# Patient Record
Sex: FEMALE | Race: WHITE | Employment: FULL TIME | URBAN - METROPOLITAN AREA
[De-identification: names, ages, dates, MRNs, and addresses within clinical notes are randomized per-mention and may not be internally consistent; named-entity substitution may affect disease eponyms.]

---

## 2019-08-09 PROBLEM — F33.1 DEPRESSION, MAJOR, RECURRENT, MODERATE (HCC): Status: ACTIVE | Noted: 2019-08-09

## 2019-08-09 PROBLEM — E66.01 OBESITY, MORBID (HCC): Status: ACTIVE | Noted: 2019-08-09

## 2020-03-12 ENCOUNTER — HOSPITAL ENCOUNTER (EMERGENCY)
Age: 35
Discharge: HOME | End: 2020-03-12
Attending: EMERGENCY MEDICINE
Payer: SELF-PAY

## 2020-03-12 VITALS
WEIGHT: 210 LBS | TEMPERATURE: 97.9 F | SYSTOLIC BLOOD PRESSURE: 140 MMHG | DIASTOLIC BLOOD PRESSURE: 83 MMHG | BODY MASS INDEX: 32.96 KG/M2 | HEART RATE: 108 BPM | HEIGHT: 67 IN | OXYGEN SATURATION: 96 % | RESPIRATION RATE: 20 BRPM

## 2020-03-12 DIAGNOSIS — S61.512A LACERATION OF LEFT WRIST, INITIAL ENCOUNTER: Primary | ICD-10-CM

## 2020-03-12 DIAGNOSIS — S66.922A: ICD-10-CM

## 2020-03-12 PROCEDURE — 75810000293 HC SIMP/SUPERF WND  RPR

## 2020-03-12 PROCEDURE — 74011250636 HC RX REV CODE- 250/636: Performed by: EMERGENCY MEDICINE

## 2020-03-12 PROCEDURE — 90715 TDAP VACCINE 7 YRS/> IM: CPT | Performed by: EMERGENCY MEDICINE

## 2020-03-12 PROCEDURE — 99284 EMERGENCY DEPT VISIT MOD MDM: CPT

## 2020-03-12 PROCEDURE — 90471 IMMUNIZATION ADMIN: CPT

## 2020-03-12 RX ORDER — KETOROLAC TROMETHAMINE 10 MG/1
10 TABLET, FILM COATED ORAL
Qty: 15 TAB | Refills: 0 | Status: SHIPPED | OUTPATIENT
Start: 2020-03-12 | End: 2020-03-17

## 2020-03-12 RX ADMIN — TETANUS TOXOID, REDUCED DIPHTHERIA TOXOID AND ACELLULAR PERTUSSIS VACCINE, ADSORBED 0.5 ML: 5; 2.5; 8; 8; 2.5 SUSPENSION INTRAMUSCULAR at 03:13

## 2020-03-12 NOTE — LETTER
[facility] 
[address] [facility] 
[phone] Work/School Note Date: 3/12/2020 To Whom It May concern: 
 
Severo Mew was seen and treated today in the emergency room by the following provider(s): 
Attending Provider: Tamiko French MD.   
 
Severo Mew may return to work on 3/14/20, no lifting with left arm until seen by orthopedics.  
 
Sincerely, 
 
 
 
 
Lexine Dakin, MD

## 2020-03-12 NOTE — LETTER
[address] [facility] EMERGENCY DEPT 
[address] [facility] 
[phone] Work/School Note Date: 3/12/2020 To Whom It May concern: 
 
Ryan Ochoa was seen and treated today in the emergency room by the following provider(s): 
Attending Provider: Elías Martin MD.   
 
Ryan Ochoa may return to work on 3/14/20.  
 
Sincerely, 
 
 
 
 
Rebecca Palacios MD

## 2020-03-12 NOTE — ED TRIAGE NOTES
Reports laceration to left wrist. Admits to \"self harm\" however states \"I got a new knife and I didn't realize it was that sharp\". +etoh, states \"a lot\". Denies SI. Calm and cooperative on arrival. Heart rate in 130s, reports currently prescribed adderall.  Able to move all fingers to left hand on arrival

## 2020-03-12 NOTE — ED NOTES
I have reviewed discharge instructions with the patient. The patient verbalized understanding. Patient left ED via Discharge Method: ambulatory to Home with self    Opportunity for questions and clarification provided. Patient given 1 scripts. Work note provided    To continue your aftercare when you leave the hospital, you may receive an automated call from our care team to check in on how you are doing. This is a free service and part of our promise to provide the best care and service to meet your aftercare needs.  If you have questions, or wish to unsubscribe from this service please call [phone]. Thank you for Choosing our [facility] Emergency Department.

## 2020-03-12 NOTE — ED PROVIDER NOTES
Caryl Hobson is a 29 y.o. female seen on 3/12/2020 at 1:35 AM in the [facility] EMERGENCY DEPT in room ER08/08. Chief Complaint   Patient presents with    Laceration     HPI: 60-year-old female with a history of cutting presenting to the emergency department for a self-inflicted laceration of the left wrist.  She states she typically cuts on the lateral aspect of her hip, but tonight she decided to \"cut a small nick\" in her wrist.  However she ended up cutting much deeper than she planned. She denies any suicidal or homicidal thoughts. She does admit to drinking alcohol tonight. She used a knife that she states was new and clean. She notes her tetanus is up-to-date. Historian: Patient    REVIEW OF SYSTEMS     Review of Systems   Constitutional: Negative for fever. HENT: Negative. Eyes: Negative. Respiratory: Negative for cough, chest tightness, shortness of breath and wheezing. Cardiovascular: Negative for chest pain. Gastrointestinal: Negative for abdominal distention, abdominal pain, constipation, diarrhea and vomiting. Endocrine: Negative. Genitourinary: Negative for dysuria, flank pain, frequency and urgency. Skin: Positive for wound. Negative for color change and pallor. Neurological: Negative for dizziness, syncope and headaches. Psychiatric/Behavioral: Negative. All other systems reviewed and are negative.       PAST MEDICAL HISTORY     Past Medical History:   Diagnosis Date    ADD (attention deficit disorder)     Depression     Hypertension     Psoriasis      Past Surgical History:   Procedure Laterality Date    HX ORTHOPAEDIC Right     foot surgery (3 screws/then removed    HX WISDOM TEETH EXTRACTION       Social History     Socioeconomic History    Marital status: SINGLE     Spouse name: Not on file    Number of children: Not on file    Years of education: Not on file    Highest education level: Not on file Tobacco Use    Smoking status: Light Tobacco Smoker    Smokeless tobacco: Never Used    Tobacco comment: pack last a week/smoke only when drinking   Substance and Sexual Activity    Alcohol use: Yes     Alcohol/week: 2.0 standard drinks     Types: 2 Shots of liquor per week    Drug use: Never    Sexual activity: Not Currently     Prior to Admission Medications   Prescriptions Last Dose Informant Patient Reported? Taking?   dextroamphetamine-amphetamine (ADDERALL) 30 mg tablet   No No   Sig: Take 1 Tab by mouth two (2) times a day. Max Daily Amount: 2 Tabs. Indications: 3   dextroamphetamine-amphetamine (ADDERALL) 30 mg tablet   No No   Sig: Take 1 Tab by mouth two (2) times a day. Max Daily Amount: 2 Tabs. Indications: 2   dextroamphetamine-amphetamine (ADDERALL) 30 mg tablet   No No   Sig: Take 1 Tab by mouth two (2) times a day. Max Daily Amount: 2 Tabs. Indications: 1   sertraline (ZOLOFT) 100 mg tablet   No No   Sig: Take 2 Tabs by mouth daily. traZODone (DESYREL) 50 mg tablet   No No   Sig: Take 1 Tab by mouth nightly. Facility-Administered Medications: None     No Known Allergies     PHYSICAL EXAM       Vitals:    03/12/20 0135 03/12/20 0200 03/12/20 0201 03/12/20 0203   BP: 148/89 154/88 148/82    Pulse: (!) 136 (!) 127 (!) 122    Resp: 20      Temp: 98.3 °F (36.8 °C)      SpO2: 98% 92% 98% 97%    Vital signs were reviewed. Physical Exam  Constitutional:       General: She is not in acute distress. Appearance: She is well-developed. HENT:      Head: Normocephalic and atraumatic. Eyes:      Pupils: Pupils are equal, round, and reactive to light. Neck:      Musculoskeletal: Normal range of motion. Cardiovascular:      Comments: 2+ radial and ulnar pulses in the left breast, good capillary refill in the left hand  Pulmonary:      Effort: Pulmonary effort is normal.   Musculoskeletal: Normal range of motion.       Comments: Full strength with flexion extension in all 5 digits of L hand including flexion at MCP, PIP, DIP, flexion at the wrist intact   Skin:     Findings: Lesion present. Comments: 5 cm laceration over medial aspect of flexor surface of L wrist, extending through the subcutaneous tissue and adipose tissue, there is an apparent visible tendon injury   Neurological:      Mental Status: She is alert and oriented to person, place, and time. Comments: Sensation intact in the left hand in all distributions   Psychiatric:         Behavior: Behavior normal.                      MEDICAL DECISION MAKING     MDM    Wound Repair  Date/Time: 3/12/2020 2:22 AM  Performed by: attending and studentPreparation: skin prepped with Betadine  Location details: left wrist  Wound length:2.6 - 7.5 cm  Anesthesia: local infiltration    Anesthesia:  Local Anesthetic: lidocaine 1% without epinephrine  Foreign bodies: no foreign bodies  Irrigation method: jet lavage  Skin closure: Prolene  Number of sutures: 10  Technique: simple  Approximation: close  Dressing: 4x4  Comments: Wound does appear to have tendon injury        ED Course: In talking to pt further, she is now not sure about tetanus status. Will update here. Patient does have an apparent tendon injury though no obvious deficits on exam with range of motion. No apparent neuro/vascular injury . The laceration will be closed and the patient placed in a splint and sent for follow-up with hand surgery for further evaluation . Pt is not suicidal.    Disposition:  Dc home  Diagnosis:  Laceration wrist  ____________________________________________________________________  A portion of this note was generated using voice recognition dictation software. While the note has been reviewed for accuracy, please note certain words and phrases may not be transcribed as intended and some grammatical and/or typographical errors may be present.

## 2020-03-12 NOTE — DISCHARGE INSTRUCTIONS
As we discussed, it appears as though you may have lacerated a tendon in your wrist.  Because of this you should wear wrist brace at all times to minimize movement.   It is important that you call the orthopedic office for follow-up appointment